# Patient Record
Sex: FEMALE | Race: WHITE | NOT HISPANIC OR LATINO | Employment: FULL TIME | URBAN - METROPOLITAN AREA
[De-identification: names, ages, dates, MRNs, and addresses within clinical notes are randomized per-mention and may not be internally consistent; named-entity substitution may affect disease eponyms.]

---

## 2017-07-19 ENCOUNTER — OFFICE VISIT (OUTPATIENT)
Dept: BARIATRICS/WEIGHT MGMT | Facility: CLINIC | Age: 52
End: 2017-07-19

## 2017-07-19 VITALS
TEMPERATURE: 98.2 F | HEIGHT: 60 IN | HEART RATE: 73 BPM | BODY MASS INDEX: 37.3 KG/M2 | RESPIRATION RATE: 22 BRPM | WEIGHT: 190 LBS | OXYGEN SATURATION: 98 % | SYSTOLIC BLOOD PRESSURE: 126 MMHG | DIASTOLIC BLOOD PRESSURE: 78 MMHG

## 2017-07-19 DIAGNOSIS — E66.9 OBESITY, CLASS II, BMI 35-39.9: ICD-10-CM

## 2017-07-19 DIAGNOSIS — R53.83 FATIGUE, UNSPECIFIED TYPE: ICD-10-CM

## 2017-07-19 DIAGNOSIS — Z13.21 MALNUTRITION SCREEN: ICD-10-CM

## 2017-07-19 DIAGNOSIS — Z98.84 STATUS POST BARIATRIC SURGERY: Primary | ICD-10-CM

## 2017-07-19 PROCEDURE — 99205 OFFICE O/P NEW HI 60 MIN: CPT | Performed by: SURGERY

## 2017-07-19 NOTE — PROGRESS NOTES
"[facility] Bariatric Surgery  [address]  [facility] [address] [phone]        Patient Name:  Milli Mckinney.  :  1965      Date of Visit: 2017      Reason for Visit:   5 years postop     HPI: Milli Mckinney is a 52 y.o. female s/p LSG by Dr. Bojorquez 9/10/12     Doing well.  Last seen 4 years ago.  Feels unsatisfied with amount of of weight lost.       Denies dysphagia, reflux, nausea, vomiting and abdominal pain.  Getting 70-100g prot/day.  Drinking \"not much\" fluid oz/day of water.  Taking no vitamins.  Vitamin D has run low in the past. Exercising--has been doing walk away the pounds 3-4 times per week and this has helped her lose weight recently.    Typically has a protein shake every morning.  Lunch: either good or bad.  Most meals are chicken, but also eats chips and sweets.  Dinner: chicken broccoli stir eastman vs. Eat out.  Some snacking when she has a hot flash.  Has had some little do snack cakes.  Occasionally has a real soft drink, but has had more sweet tea in the past.     +hair loss, +fatigue     Presurgery weight: 258 pounds.  Weight sotero is 182, then recently up to 203, but has lost some weight down to 190.  Today's weight is 190 lb (86.2 kg) pounds, today's  Body mass index is 37.11 kg/(m^2)., and her weight loss since surgery is 68 pounds.      Past Medical History:   Diagnosis Date   • Back pain    • IBS (irritable bowel syndrome)    • Knee pain    • Plantar fasciitis      Past Surgical History:   Procedure Laterality Date   • CHOLECYSTECTOMY      Laparoscopic   • COLONOSCOPY     • GASTRIC SLEEVE LAPAROSCOPIC      GDW   • TUBAL ABDOMINAL LIGATION       No outpatient prescriptions have been marked as taking for the 17 encounter (Office Visit) with Janice Mckeon MD.       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         /78 (BP Location: Right arm, Patient Position: Sitting, Cuff Size: Large Adult)  Pulse 73  Temp 98.2 °F (36.8 °C) (Temporal Artery )   Resp 22  Ht 60\" (152.4 cm)  Wt 190 lb (86.2 kg)  SpO2 98%  BMI 37.11 kg/m2    Physical Exam   Constitutional: She is oriented to person, place, and time. She appears well-developed and well-nourished.   HENT:   Head: Normocephalic and atraumatic.   Mouth/Throat: No oropharyngeal exudate.   Eyes: EOM are normal. Pupils are equal, round, and reactive to light. No scleral icterus.   Neck: Normal range of motion. Neck supple. No thyromegaly present.   Cardiovascular: Normal rate, regular rhythm and normal heart sounds.    Pulmonary/Chest: Effort normal and breath sounds normal. No respiratory distress.   Abdominal: Soft. She exhibits no distension and no mass. There is no tenderness. No hernia.   Lap incisions well-healed.  Loose skin   Musculoskeletal: Normal range of motion. She exhibits no edema or deformity.   Neurological: She is alert and oriented to person, place, and time. No cranial nerve deficit.   Skin: Skin is warm and dry. She is not diaphoretic. No erythema.   Psychiatric: She has a normal mood and affect. Her behavior is normal. Judgment and thought content normal.         Assessment:  5 years s/p LSG by Dr. Bojorquez 9/10/12     ICD-10-CM ICD-9-CM   1. Status post bariatric surgery Z98.84 V45.86   2. Malnutrition screen Z13.21 V77.2   3. Obesity, Class II, BMI 35-39.9 E66.01 278.01         Plan:  Doing well. Continue w/ good food choices and healthy habits.  Continue protein >70g/day.  Continue routine exercise.  " Routine bariatric labs ordered.  Continue vitamins w/ adjustments pending lab results.  Call w/ problems/concerns.     Will get BMR today.  Pt instructed to drop calories 300-500 lower than BMR to lose weight.  Needs to increase exercise to 30 minutes per day for 5 days per week.      The patient was instructed to follow up in 1 year, sooner if needed.    I spent 60 minutes with the patient and over half the time was spent counseling on nutrition and necessary dietary/lifestyle modifications.    Janice Mckeon MD

## 2017-07-22 LAB
25(OH)D3+25(OH)D2 SERPL-MCNC: 41.6 NG/ML
A-TOCOPHEROL VIT E SERPL-MCNC: 11 MG/L (ref 5.3–16.8)
ALBUMIN SERPL-MCNC: 4.4 G/DL (ref 3.2–4.8)
ALBUMIN/GLOB SERPL: 1.5 G/DL (ref 1.5–2.5)
ALP SERPL-CCNC: 92 U/L (ref 25–100)
ALT SERPL-CCNC: 16 U/L (ref 7–40)
AST SERPL-CCNC: 21 U/L (ref 0–33)
BASOPHILS # BLD AUTO: 0.01 10*3/MM3 (ref 0–0.2)
BASOPHILS NFR BLD AUTO: 0.1 % (ref 0–1)
BILIRUB SERPL-MCNC: 1.2 MG/DL (ref 0.3–1.2)
BUN SERPL-MCNC: 17 MG/DL (ref 9–23)
BUN/CREAT SERPL: 24.3 (ref 7–25)
CALCIUM SERPL-MCNC: 10.2 MG/DL (ref 8.7–10.4)
CHLORIDE SERPL-SCNC: 102 MMOL/L (ref 99–109)
CO2 SERPL-SCNC: 23 MMOL/L (ref 20–31)
CREAT SERPL-MCNC: 0.7 MG/DL (ref 0.6–1.3)
EOSINOPHIL # BLD AUTO: 0.14 10*3/MM3 (ref 0–0.3)
EOSINOPHIL NFR BLD AUTO: 1.9 % (ref 0–3)
ERYTHROCYTE [DISTWIDTH] IN BLOOD BY AUTOMATED COUNT: 12.5 % (ref 11.3–14.5)
FERRITIN SERPL-MCNC: 332 NG/ML (ref 10–291)
FOLATE SERPL-MCNC: >24 NG/ML (ref 3.2–20)
GLOBULIN SER CALC-MCNC: 3 GM/DL
GLUCOSE SERPL-MCNC: 74 MG/DL (ref 70–100)
HCT VFR BLD AUTO: 45 % (ref 34.5–44)
HGB BLD-MCNC: 14.5 G/DL (ref 11.5–15.5)
IMM GRANULOCYTES # BLD: 0.02 10*3/MM3 (ref 0–0.03)
IMM GRANULOCYTES NFR BLD: 0.3 % (ref 0–0.6)
IRON SERPL-MCNC: 86 MCG/DL (ref 50–175)
LYMPHOCYTES # BLD AUTO: 2.96 10*3/MM3 (ref 0.6–4.8)
LYMPHOCYTES NFR BLD AUTO: 41.2 % (ref 24–44)
MAGNESIUM SERPL-MCNC: 2.2 MG/DL (ref 1.3–2.7)
MCH RBC QN AUTO: 28.7 PG (ref 27–31)
MCHC RBC AUTO-ENTMCNC: 32.2 G/DL (ref 32–36)
MCV RBC AUTO: 89.1 FL (ref 80–99)
METHYLMALONATE SERPL-SCNC: 261 NMOL/L (ref 0–378)
MONOCYTES # BLD AUTO: 0.52 10*3/MM3 (ref 0–1)
MONOCYTES NFR BLD AUTO: 7.2 % (ref 0–12)
NEUTROPHILS # BLD AUTO: 3.54 10*3/MM3 (ref 1.5–8.3)
NEUTROPHILS NFR BLD AUTO: 49.3 % (ref 41–71)
PHOSPHATE SERPL-MCNC: 3.8 MG/DL (ref 2.4–5.1)
PLATELET # BLD AUTO: 216 10*3/MM3 (ref 150–450)
POTASSIUM SERPL-SCNC: 4.2 MMOL/L (ref 3.5–5.5)
PREALB SERPL-MCNC: 22 MG/DL (ref 12–34)
PROT SERPL-MCNC: 7.4 G/DL (ref 5.7–8.2)
PTH-INTACT SERPL-MCNC: 26 PG/ML (ref 15–65)
RBC # BLD AUTO: 5.05 10*6/MM3 (ref 3.89–5.14)
SODIUM SERPL-SCNC: 137 MMOL/L (ref 132–146)
VIT A SERPL-MCNC: 53 UG/DL (ref 20–65)
VIT B1 BLD-SCNC: 135.7 NMOL/L (ref 66.5–200)
WBC # BLD AUTO: 7.19 10*3/MM3 (ref 3.5–10.8)
ZINC SERPL-MCNC: 82 UG/DL (ref 56–134)

## 2018-07-18 ENCOUNTER — OFFICE VISIT (OUTPATIENT)
Dept: BARIATRICS/WEIGHT MGMT | Facility: CLINIC | Age: 53
End: 2018-07-18

## 2018-07-18 VITALS
OXYGEN SATURATION: 99 % | BODY MASS INDEX: 36.81 KG/M2 | HEIGHT: 60 IN | TEMPERATURE: 97.7 F | WEIGHT: 187.51 LBS | HEART RATE: 76 BPM | SYSTOLIC BLOOD PRESSURE: 116 MMHG | RESPIRATION RATE: 18 BRPM | DIASTOLIC BLOOD PRESSURE: 80 MMHG

## 2018-07-18 DIAGNOSIS — Z13.21 MALNUTRITION SCREEN: ICD-10-CM

## 2018-07-18 DIAGNOSIS — R53.83 FATIGUE, UNSPECIFIED TYPE: Primary | ICD-10-CM

## 2018-07-18 DIAGNOSIS — E55.9 HYPOVITAMINOSIS D: ICD-10-CM

## 2018-07-18 DIAGNOSIS — Z13.0 SCREENING, IRON DEFICIENCY ANEMIA: ICD-10-CM

## 2018-07-18 DIAGNOSIS — Z98.84 STATUS POST BARIATRIC SURGERY: ICD-10-CM

## 2018-07-18 DIAGNOSIS — R11.0 NAUSEA: ICD-10-CM

## 2018-07-18 DIAGNOSIS — E53.8 B12 DEFICIENCY: ICD-10-CM

## 2018-07-18 PROCEDURE — 99214 OFFICE O/P EST MOD 30 MIN: CPT | Performed by: SURGERY

## 2018-07-18 RX ORDER — ERGOCALCIFEROL 1.25 MG/1
CAPSULE ORAL
Refills: 5 | COMMUNITY
Start: 2018-07-02

## 2018-07-18 NOTE — PROGRESS NOTES
[facility] Bariatric Surgery  [address]  [facility] [id number] [phone]        Patient Name:  Milli Mckinney.  :  1965      Date of Visit: 2018      Reason for Visit:   6 years postop      HPI: Milli Mckinney is a 53 y.o. female s/p LSG by Dr. Bojorquez 9/10/12    Doing well. +IBS with diarrhea, predates surgery.  Wears bifocals now.  Reports some short- term memory loss.  +Fatigue.  Denies dysphagia, reflux, nausea, vomiting and abdominal pain.  Getting 70+g prot/day.  Drinking <64 fluid oz/day. Last labs revealed low B12.  Takes 50,000 U weekly--prescribed by her PCP.  Exercise: on hold due to foot pain and knee osteoarthritis.  Walks 3 days per week, 30 minutes at a time.       Presurgery weight: 258 pounds.  Today's weight is 85.1 kg (187 lb 8.2 oz) pounds, today's  Body mass index is 36.62 kg/m²., and her weight loss since surgery is 71 pounds.  To get BMI <30, will need to weigh 152 or less.  Had gained 20 pounds this year, but lost and has been on Weight Watchers to help her stay accountable past 3-4 months.      Quit drinking sweet tea last year.  Has an occasional sip of Mountain Dew.  Drinks Diet drinks.      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  Has identified issues: no longer eats breakfast/lunch at cafeteria.  Brings her lunch now.  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      Past Medical History:   Diagnosis Date   • Back pain    • IBS (irritable bowel syndrome)    • Knee pain    • Plantar fasciitis      Past Surgical History:   Procedure Laterality Date   • CHOLECYSTECTOMY      Laparoscopic   • COLONOSCOPY     • GASTRIC SLEEVE LAPAROSCOPIC      GDW   • TUBAL ABDOMINAL LIGATION       Outpatient Prescriptions Marked as Taking for the 18 encounter (Office Visit) with Janice Mckeon MD   Medication Sig  "Dispense Refill   • vitamin D (ERGOCALCIFEROL) 23304 units capsule capsule TAKE 1 CAPSULE S  BY MOUTH EVERY WEEK  5       No Known Allergies    Social History     Social History   • Marital status:      Spouse name: N/A   • Number of children: N/A   • Years of education: N/A     Occupational History   • 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         /80 (BP Location: Left arm, Patient Position: Sitting, Cuff Size: Large Adult)   Pulse 76   Temp 97.7 °F (36.5 °C) (Temporal Artery )   Resp 18   Ht 152.4 cm (60\")   Wt 85.1 kg (187 lb 8.2 oz)   SpO2 99%   BMI 36.62 kg/m²     Physical Exam   Constitutional: She is oriented to person, place, and time. She appears well-developed and well-nourished. No distress.   HENT:   Head: Normocephalic and atraumatic.   Mouth/Throat: No oropharyngeal exudate.   Pulmonary/Chest: Effort normal. No respiratory distress.   Abdominal: Soft. She exhibits no distension.   Neurological: She is alert and oriented to person, place, and time. No cranial nerve deficit.   Skin: Skin is warm and dry. No rash noted. She is not diaphoretic. No erythema.   Psychiatric: She has a normal mood and affect. Her behavior is normal. Judgment and thought content normal.         Assessment:  6 years s/p LSG by Dr. Bojorquez 9/10/12    ICD-10-CM ICD-9-CM   1. Fatigue, unspecified type R53.83 780.79   2. Hypovitaminosis D E55.9 268.9   3. Malnutrition screen Z13.21 V77.2   4. Screening, iron deficiency anemia Z13.0 V78.0   5. Nausea R11.0 787.02   6. B12 deficiency E53.8 266.2   7. Status post bariatric surgery Z98.84 V45.86         Plan:  Doing well. Continue w/ good food choices and healthy habits.  Increase protein 100 " g/day.  Continue routine exercise.  Routine bariatric labs ordered.  Continue vitamins w/ adjustments pending lab results.  Call w/ problems/concerns.     Add strength training daily, increase protein to 100 g/day.  Work on meal planning.      The patient was instructed to follow up in 1 year, sooner if needed.    note: approx 15 of the 25 minute visit was spent counseling on nutrition and necessary dietary/lifestyle modifications.    Janice Mckeon MD

## 2018-07-25 LAB
25(OH)D3+25(OH)D2 SERPL-MCNC: 37 NG/ML (ref 30–100)
A-TOCOPHEROL VIT E SERPL-MCNC: 10.8 MG/L (ref 7–25.1)
ALBUMIN SERPL-MCNC: 4.5 G/DL (ref 3.5–5.5)
ALBUMIN/GLOB SERPL: 1.6 {RATIO} (ref 1.2–2.2)
ALP SERPL-CCNC: 106 IU/L (ref 39–117)
ALT SERPL-CCNC: 14 IU/L (ref 0–32)
AST SERPL-CCNC: 20 IU/L (ref 0–40)
BASOPHILS # BLD AUTO: 0 X10E3/UL (ref 0–0.2)
BASOPHILS NFR BLD AUTO: 0 %
BILIRUB SERPL-MCNC: 1 MG/DL (ref 0–1.2)
BUN SERPL-MCNC: 11 MG/DL (ref 6–24)
BUN/CREAT SERPL: 15 (ref 9–23)
CALCIUM SERPL-MCNC: 9.7 MG/DL (ref 8.7–10.2)
CHLORIDE SERPL-SCNC: 103 MMOL/L (ref 96–106)
CO2 SERPL-SCNC: 26 MMOL/L (ref 20–29)
CREAT SERPL-MCNC: 0.74 MG/DL (ref 0.57–1)
EOSINOPHIL # BLD AUTO: 0.3 X10E3/UL (ref 0–0.4)
EOSINOPHIL NFR BLD AUTO: 3 %
ERYTHROCYTE [DISTWIDTH] IN BLOOD BY AUTOMATED COUNT: 13.2 % (ref 12.3–15.4)
FERRITIN SERPL-MCNC: 381 NG/ML (ref 15–150)
FOLATE SERPL-MCNC: 18.4 NG/ML
GAMMA TOCOPHEROL SERPL-MCNC: 1.7 MG/L (ref 0.5–5.5)
GLOBULIN SER CALC-MCNC: 2.8 G/DL (ref 1.5–4.5)
GLUCOSE SERPL-MCNC: 74 MG/DL (ref 65–99)
HCT VFR BLD AUTO: 42.6 % (ref 34–46.6)
HGB BLD-MCNC: 14.1 G/DL (ref 11.1–15.9)
IMM GRANULOCYTES # BLD: 0 X10E3/UL (ref 0–0.1)
IMM GRANULOCYTES NFR BLD: 0 %
IRON SERPL-MCNC: 96 UG/DL (ref 27–159)
LYMPHOCYTES # BLD AUTO: 3.5 X10E3/UL (ref 0.7–3.1)
LYMPHOCYTES NFR BLD AUTO: 40 %
Lab: NORMAL
Lab: NORMAL
MAGNESIUM SERPL-MCNC: 2.2 MG/DL (ref 1.6–2.3)
MCH RBC QN AUTO: 29.5 PG (ref 26.6–33)
MCHC RBC AUTO-ENTMCNC: 33.1 G/DL (ref 31.5–35.7)
MCV RBC AUTO: 89 FL (ref 79–97)
METHYLMALONATE SERPL-SCNC: 236 NMOL/L (ref 0–378)
MONOCYTES # BLD AUTO: 0.7 X10E3/UL (ref 0.1–0.9)
MONOCYTES NFR BLD AUTO: 7 %
NEUTROPHILS # BLD AUTO: 4.3 X10E3/UL (ref 1.4–7)
NEUTROPHILS NFR BLD AUTO: 50 %
PHOSPHATE SERPL-MCNC: 4 MG/DL (ref 2.5–4.5)
PLATELET # BLD AUTO: 241 X10E3/UL (ref 150–379)
POTASSIUM SERPL-SCNC: 4.1 MMOL/L (ref 3.5–5.2)
PREALB SERPL-MCNC: 22 MG/DL (ref 10–36)
PROT SERPL-MCNC: 7.3 G/DL (ref 6–8.5)
PTH-INTACT SERPL-MCNC: 44 PG/ML (ref 15–65)
RBC # BLD AUTO: 4.78 X10E6/UL (ref 3.77–5.28)
SODIUM SERPL-SCNC: 143 MMOL/L (ref 134–144)
VIT A SERPL-MCNC: 44 UG/DL (ref 33.1–100)
VIT B1 BLD-SCNC: 159 NMOL/L (ref 66.5–200)
WBC # BLD AUTO: 8.7 X10E3/UL (ref 3.4–10.8)
ZINC SERPL-MCNC: 75 UG/DL (ref 56–134)

## 2021-03-23 ENCOUNTER — BULK ORDERING (OUTPATIENT)
Dept: CASE MANAGEMENT | Facility: OTHER | Age: 56
End: 2021-03-23

## 2021-03-23 DIAGNOSIS — Z23 IMMUNIZATION DUE: ICD-10-CM
